# Patient Record
Sex: FEMALE | Race: BLACK OR AFRICAN AMERICAN | NOT HISPANIC OR LATINO | Employment: OTHER | URBAN - METROPOLITAN AREA
[De-identification: names, ages, dates, MRNs, and addresses within clinical notes are randomized per-mention and may not be internally consistent; named-entity substitution may affect disease eponyms.]

---

## 2017-01-23 ENCOUNTER — TELEPHONE (OUTPATIENT)
Dept: HEMATOLOGY/ONCOLOGY | Facility: CLINIC | Age: 67
End: 2017-01-23

## 2017-02-08 ENCOUNTER — TELEPHONE (OUTPATIENT)
Dept: HEMATOLOGY/ONCOLOGY | Facility: CLINIC | Age: 67
End: 2017-02-08

## 2017-02-17 ENCOUNTER — LAB VISIT (OUTPATIENT)
Dept: LAB | Facility: HOSPITAL | Age: 67
End: 2017-02-17
Attending: INTERNAL MEDICINE
Payer: MEDICAID

## 2017-02-17 ENCOUNTER — OFFICE VISIT (OUTPATIENT)
Dept: HEMATOLOGY/ONCOLOGY | Facility: CLINIC | Age: 67
End: 2017-02-17
Payer: MEDICAID

## 2017-02-17 VITALS
OXYGEN SATURATION: 98 % | RESPIRATION RATE: 18 BRPM | BODY MASS INDEX: 24.06 KG/M2 | TEMPERATURE: 98 F | WEIGHT: 149.69 LBS | SYSTOLIC BLOOD PRESSURE: 120 MMHG | DIASTOLIC BLOOD PRESSURE: 86 MMHG | HEIGHT: 66 IN | HEART RATE: 64 BPM

## 2017-02-17 DIAGNOSIS — D72.829 LEUKOCYTOSIS, UNSPECIFIED TYPE: ICD-10-CM

## 2017-02-17 DIAGNOSIS — D47.1 PRIMARY MYELOFIBROSIS: Primary | ICD-10-CM

## 2017-02-17 DIAGNOSIS — G89.29 CHRONIC LOW BACK PAIN WITHOUT SCIATICA, UNSPECIFIED BACK PAIN LATERALITY: ICD-10-CM

## 2017-02-17 DIAGNOSIS — M54.50 CHRONIC LOW BACK PAIN WITHOUT SCIATICA, UNSPECIFIED BACK PAIN LATERALITY: ICD-10-CM

## 2017-02-17 DIAGNOSIS — D63.0 ANEMIA IN NEOPLASTIC DISEASE: ICD-10-CM

## 2017-02-17 LAB
ALBUMIN SERPL BCP-MCNC: 3.9 G/DL
ALP SERPL-CCNC: 70 U/L
ALT SERPL W/O P-5'-P-CCNC: 29 U/L
ANION GAP SERPL CALC-SCNC: 12 MMOL/L
ANISOCYTOSIS BLD QL SMEAR: SLIGHT
AST SERPL-CCNC: 55 U/L
BASOPHILS NFR BLD: 1 %
BILIRUB SERPL-MCNC: 0.4 MG/DL
BLASTS NFR BLD MANUAL: 2 %
BUN SERPL-MCNC: 14 MG/DL
CALCIUM SERPL-MCNC: 9.9 MG/DL
CHLORIDE SERPL-SCNC: 107 MMOL/L
CO2 SERPL-SCNC: 20 MMOL/L
CREAT SERPL-MCNC: 0.9 MG/DL
DACRYOCYTES BLD QL SMEAR: ABNORMAL
DIFFERENTIAL METHOD: ABNORMAL
EOSINOPHIL NFR BLD: 0 %
ERYTHROCYTE [DISTWIDTH] IN BLOOD BY AUTOMATED COUNT: 16.4 %
EST. GFR  (AFRICAN AMERICAN): >60 ML/MIN/1.73 M^2
EST. GFR  (NON AFRICAN AMERICAN): >60 ML/MIN/1.73 M^2
GLUCOSE SERPL-MCNC: 97 MG/DL
HCT VFR BLD AUTO: 34.1 %
HGB BLD-MCNC: 11 G/DL
LDH SERPL L TO P-CCNC: 866 U/L
LYMPHOCYTES NFR BLD: 31 %
MCH RBC QN AUTO: 29.7 PG
MCHC RBC AUTO-ENTMCNC: 32.3 %
MCV RBC AUTO: 92 FL
METAMYELOCYTES NFR BLD MANUAL: 2 %
MONOCYTES NFR BLD: 5 %
NEUTROPHILS NFR BLD: 50 %
NEUTS BAND NFR BLD MANUAL: 8 %
NRBC BLD-RTO: ABNORMAL /100 WBC
PATH REV BLD -IMP: NORMAL
PLATELET # BLD AUTO: 163 K/UL
PLATELET BLD QL SMEAR: ABNORMAL
PMV BLD AUTO: 11.2 FL
POIKILOCYTOSIS BLD QL SMEAR: SLIGHT
POLYCHROMASIA BLD QL SMEAR: ABNORMAL
POTASSIUM SERPL-SCNC: 5.1 MMOL/L
PROMYELOCYTES NFR BLD MANUAL: 1 %
PROT SERPL-MCNC: 8.8 G/DL
RBC # BLD AUTO: 3.7 M/UL
SODIUM SERPL-SCNC: 139 MMOL/L
WBC # BLD AUTO: 31.52 K/UL

## 2017-02-17 PROCEDURE — 99213 OFFICE O/P EST LOW 20 MIN: CPT | Mod: PBBFAC,PO | Performed by: INTERNAL MEDICINE

## 2017-02-17 PROCEDURE — 99214 OFFICE O/P EST MOD 30 MIN: CPT | Mod: S$PBB,,, | Performed by: INTERNAL MEDICINE

## 2017-02-17 PROCEDURE — 85060 BLOOD SMEAR INTERPRETATION: CPT | Mod: ,,, | Performed by: PATHOLOGY

## 2017-02-17 PROCEDURE — 99999 PR PBB SHADOW E&M-EST. PATIENT-LVL III: CPT | Mod: PBBFAC,,, | Performed by: INTERNAL MEDICINE

## 2017-02-17 NOTE — PROGRESS NOTES
Hematology/Oncology Established Visist    PCP: Dr. Quentin Guillen    [facility] Diagnosis: Primary myelofibrosis, MPL positive    Stage: DIPSS-Plus: Intermediate-1    Pathology: Bone marrow biopsy 10/26/16:  CELLULARITY= 90%, TRILINEAGE HEMATOPOIETIC ACTIVITY. GRADE 2 RETICULAR FIBROSIS. SEE COMMENT. DECREASED ERYTHROID PRECURSORS. INCREASED STORAGE IRON.  ADEQUATE NUMBER OF MEGAKARYOCYTES WITH ATYPICAL MORPHOLOGY.  COMMENT: Flow cytometry analysis of bone marrow aspirate shows lymph gate (10 %) containing T and B cells. No B cell clonality or T-cell aberrancy is evident. Blast gate is 5.6% with total of 3.5% CD34 positive cells.  [facility] LABORATORY  Peripheral blood ,FISH study for BCR/ABL:The result is within normal limits for the BCR and ABL1 gene regions. Peripheral blood, JAK2 exons 12-15 mutation analysis by PCR: Negative. No mutation was detected in JAK2 , exons 12-15.  Immunohistochemical studies were performed on the core biopsy for further architecture evaluation with adequate positive and negative controls. Scattered mixed predominantly T cells (CD3 positive,) and B cells (CD20 positive) are evident. Occasional CD34 positive cells are seen. CD61 highlights adequate of number of megakaryocytes. Spectrin shows decreased erythroid precursors. Correlate clinically and with a cytogenetics report for further classify this process.  Bone marrow karyotype results: 46,XX,del(20)(q11.2q13.1)[10]/46,XX[10]. Comments: Of 20 metaphases, 10 metaphases were normal and 10 metaphases had a 20q deletion.  Findings support the diagnosis of primary myelofibrosis. Correlate clinically.    Prior Treatment: None    Current Treatment: Observation    History of Present Illness:  Ms. Arora is a 67 y/o female with HTN, Hyperlipidemia, Gout and chronic low back and knee pain who comes in for evaluation of leukocytosis. Patient states she has had abnormal bruising for several years. No fevers, chills, sweats, unintentional weight  loss, appetite problems, enlarged LN. No melena/hematochezia/hematuria. She reports having a sinus infection last week and has been treated with antibiotics, but she denies any infection 6 months ago at time of blood work. No corticosteroids injections or systemic use of steroids. Review of labs from Dr. Guillen's office reveals WBC 17.5 (with 20% lymphocytes L, abs lymphs 3.6 H, 14% monocytes H, 65% neutrophils), Hgb 11.9, MCV 94, RDW 16.4, , Uric acid 7.5 approx 6 mo ago. WBC then 18.1 (with 4.2 absolute lymphs, 2.5 absol monos, 10.9 abs segs), Hgb 11,  last week.     Patient comes in for follow up of myelofibrosis, MPL positive. She denies fevers, chills, sweats, unintentional weight loss, abdominal pain. She is still having knee pain, bilateral, which is now involving bilateral inner thighs as well.    ROS:  General:  No wt loss, fever/chills, fatigue, night sweats  Eyes: No vision problems, pain or inflammation.   Ears/Nose: No difficultly hearing, ear pain, rhinorrhea, or epistaxis  Oropharynx: No ulcers, dysphagia, or odynophagia  Cardiovascular: No chest pains, sob, PND or dyspnea on exertion  Pulmonary: No cough, sob, hemoptysis  Gastrointestional: No n/v/d, melena, hematochezia, or change in bowel habits  : No dysuria, hematuria, pelvic pain or flank pain  Musculoskeletal: No myalgias, weakness +chronic bilateral knee and back pain  Neurological: No headaches, focal deficits, or seizure activity  Endocrine: No heat or cold intolerance   Skin: No rashes pruritus, or lesions  Psychiatric: No symptoms of mood disorders  Heme/Lymph: No lymph node enlargment +easy bruising for several years    Past Medical History:  1. HTN  2. Hyperlipidemia  3. Gout  4. Chronic low back pain and knee pain    Social History: . Has 7 daughters, all grown. Never smoker, no EtOH/illicits.     Family History: family history includes Cancer in her sister. Breast cancer in sister. No history of blood disorders  or sickle cell anemia/trait.    Physical Exam:  Vitals:    02/17/17 1251   BP: 120/86   Pulse: 64   Resp: 18   Temp: 98.1 °F (36.7 °C)     Body mass index is 24.16 kg/(m^2).  General:  AAOx4, no acute distress  HEENT: EOMI. Normocephalic and atraumatic. No maxillary sinus tenderness. External auditory canals clear and TMs intact without lesions. Nasal and oral mucosal membranes moist. Normal dentition and gums.   Neck: no LAD, thyromegaly, normal ROM.   Pulmonary: Bilaterally clear to auscultation, Normal effort with no accessory muscle use, no wheezes/rales/rhonchi  CV: Normal rate, regular rhythm, no murmurs/rubs/gallops, no edema  ABD:  Soft, nontender, nondistended, no mass, and without hepatosplenomegaly   Ext: No clubbing, cyanosis, or edema, normal ROM  Skin: No rashes, lesions, petechiae. Small 1cm ecchymoses scattered on bilateral upper arms  Neurological: No focal deficits, CN II to XII grossly intact, normal coordination    Psychiatric:  Normal mood, affect and judgement  Hem/Lymph:  No submandibular, cervical, supraclavicular, axillary, or inguinal LAD.    Labs:    Lab Results   Component Value Date    WBC 31.52 (H) 02/17/2017    HGB 11.0 (L) 02/17/2017    HCT 34.1 (L) 02/17/2017    MCV 92 02/17/2017     02/17/2017     Lab Results   Component Value Date     02/17/2017    K 5.1 02/17/2017     02/17/2017    CO2 20 (L) 02/17/2017    BUN 14 02/17/2017    CREATININE 0.9 02/17/2017    CALCIUM 9.9 02/17/2017    ANIONGAP 12 02/17/2017    ESTGFRAFRICA >60 02/17/2017    EGFRNONAA >60 02/17/2017     Lab Results   Component Value Date    ALT 29 02/17/2017    AST 55 (H) 02/17/2017    ALKPHOS 70 02/17/2017    BILITOT 0.4 02/17/2017       Lab Results   Component Value Date    IRON 148 11/15/2016    TIBC 429 11/15/2016    FERRITIN 362 (H) 11/15/2016     No results found for: [id number]  No results found for: FOLATE  No results found for: TSH    Imaging:  Abdominal ultraound 11/17/16:  Borderline  splenomegaly. Mild dilatation of the proximal common bile duct with poor visualization of the distal duct.  There is also slight prominence of the main pancreatic duct.  Further evaluation could be obtained with MRCP as clinically warranted..    Assessment / Plan:  Surekha Arora is a 66 y.o. female with HTN, Hyperlipidemia, Gout comes in for evaluation of chronic leukocytosis.     1. Primary myelofibrosis: Presented with leukocytosis and mild anemia. Diagnosis based on bone marrow biopsy showing 2+ reticulin fibrosis and MPL positive with negative Bcr/abl and JAK2 mutation. Risk stratification using DIPSS-Plus shows: Intermediate-1, w/ associated median survival of 6.5 yrs, and > 13 yrs in those age < 60. This disease is considered incurable outside of allogeneic stem cell transplant. However, we do not recommend aggressive chemotherapy and transplant unless patient has high risk disease. Treatment for low and intermediate risk disease is tailored toward symptom relief and only requires observation if asymptomatic.  -- Return in 4 weeks for follow up.   -- Send note to PCP, Quentin Guillen    2. Normocytic anemia: Due to underlying primary myelofibrosis.SPEP negative for monoclonal protein.    3. Easy bruising: Coags normal. This is likely due to primary myelofibrosis.     4. Health maintenance: Colonoscopy completed 8-10 yrs ago per patient at [facility].     Yumiko Neely M.D.  Hematology Oncology

## 2017-04-04 ENCOUNTER — TELEPHONE (OUTPATIENT)
Dept: HEMATOLOGY/ONCOLOGY | Facility: CLINIC | Age: 67
End: 2017-04-04

## 2017-04-05 ENCOUNTER — TELEPHONE (OUTPATIENT)
Dept: HEMATOLOGY/ONCOLOGY | Facility: CLINIC | Age: 67
End: 2017-04-05

## 2017-04-11 ENCOUNTER — TELEPHONE (OUTPATIENT)
Dept: HEMATOLOGY/ONCOLOGY | Facility: CLINIC | Age: 67
End: 2017-04-11

## 2017-04-11 NOTE — TELEPHONE ENCOUNTER
Attempted to contact patient about missed appointment. Number on file is not taking calls at this time. Will reschedule appointments and send via mail.

## 2017-04-28 ENCOUNTER — TELEPHONE (OUTPATIENT)
Dept: HEMATOLOGY/ONCOLOGY | Facility: CLINIC | Age: 67
End: 2017-04-28

## 2017-04-28 NOTE — TELEPHONE ENCOUNTER
Attempted to contact patient about missed lab appointment. Phone is not taking calls at this time.    Attempted to contact emergency contact. No answer. no voicemail.

## 2017-05-01 ENCOUNTER — TELEPHONE (OUTPATIENT)
Dept: HEMATOLOGY/ONCOLOGY | Facility: CLINIC | Age: 67
End: 2017-05-01